# Patient Record
Sex: MALE | Race: BLACK OR AFRICAN AMERICAN
[De-identification: names, ages, dates, MRNs, and addresses within clinical notes are randomized per-mention and may not be internally consistent; named-entity substitution may affect disease eponyms.]

---

## 2019-08-17 ENCOUNTER — HOSPITAL ENCOUNTER (EMERGENCY)
Dept: HOSPITAL 53 - M ED | Age: 29
Discharge: HOME | End: 2019-08-17
Payer: COMMERCIAL

## 2019-08-17 VITALS — DIASTOLIC BLOOD PRESSURE: 72 MMHG | SYSTOLIC BLOOD PRESSURE: 118 MMHG

## 2019-08-17 VITALS — BODY MASS INDEX: 19.81 KG/M2 | WEIGHT: 149.47 LBS | HEIGHT: 73 IN

## 2019-08-17 DIAGNOSIS — J02.9: Primary | ICD-10-CM

## 2019-08-17 PROCEDURE — 99284 EMERGENCY DEPT VISIT MOD MDM: CPT

## 2019-08-17 PROCEDURE — 96372 THER/PROPH/DIAG INJ SC/IM: CPT

## 2019-08-17 PROCEDURE — 87880 STREP A ASSAY W/OPTIC: CPT

## 2019-08-19 ENCOUNTER — HOSPITAL ENCOUNTER (EMERGENCY)
Dept: HOSPITAL 53 - M ED | Age: 29
Discharge: HOME | End: 2019-08-19
Payer: COMMERCIAL

## 2019-08-19 VITALS — BODY MASS INDEX: 19.46 KG/M2 | HEIGHT: 73 IN | WEIGHT: 146.83 LBS

## 2019-08-19 VITALS — DIASTOLIC BLOOD PRESSURE: 67 MMHG | SYSTOLIC BLOOD PRESSURE: 117 MMHG

## 2019-08-19 DIAGNOSIS — Z79.01: ICD-10-CM

## 2019-08-19 DIAGNOSIS — J03.90: Primary | ICD-10-CM

## 2019-08-19 LAB
BASOPHILS # BLD AUTO: 0 10^3/UL (ref 0–0.2)
BASOPHILS NFR BLD AUTO: 0.9 % (ref 0–1)
BUN SERPL-MCNC: 12 MG/DL (ref 7–18)
CALCIUM SERPL-MCNC: 9.6 MG/DL (ref 8.5–10.1)
CHLORIDE SERPL-SCNC: 104 MEQ/L (ref 98–107)
CO2 SERPL-SCNC: 30 MEQ/L (ref 21–32)
CREAT SERPL-MCNC: 0.95 MG/DL (ref 0.7–1.3)
EOSINOPHIL # BLD AUTO: 0.1 10^3/UL (ref 0–0.5)
EOSINOPHIL NFR BLD AUTO: 2.7 % (ref 0–3)
GFR SERPL CREATININE-BSD FRML MDRD: > 60 ML/MIN/{1.73_M2} (ref 60–?)
GLUCOSE SERPL-MCNC: 84 MG/DL (ref 70–100)
HCT VFR BLD AUTO: 42.1 % (ref 42–52)
HETEROPH AB SER QL LA: NEGATIVE
HGB BLD-MCNC: 14.3 G/DL (ref 13.5–17.5)
LYMPHOCYTES # BLD AUTO: 1.2 10^3/UL (ref 1.5–6.5)
LYMPHOCYTES NFR BLD AUTO: 26.7 % (ref 24–44)
MCH RBC QN AUTO: 30 PG (ref 27–33)
MCHC RBC AUTO-ENTMCNC: 34 G/DL (ref 32–36.5)
MCV RBC AUTO: 88.3 FL (ref 80–96)
MONOCYTES # BLD AUTO: 0.7 10^3/UL (ref 0–0.8)
MONOCYTES NFR BLD AUTO: 15.2 % (ref 0–5)
NEUTROPHILS # BLD AUTO: 2.4 10^3/UL (ref 1.8–7.7)
NEUTROPHILS NFR BLD AUTO: 54.3 % (ref 36–66)
PLATELET # BLD AUTO: 301 10^3/UL (ref 150–450)
POTASSIUM SERPL-SCNC: 4.4 MEQ/L (ref 3.5–5.1)
RBC # BLD AUTO: 4.77 10^6/UL (ref 4.3–6.1)
SODIUM SERPL-SCNC: 140 MEQ/L (ref 136–145)
WBC # BLD AUTO: 4.4 10^3/UL (ref 4–10)

## 2020-10-18 ENCOUNTER — HOSPITAL ENCOUNTER (EMERGENCY)
Dept: HOSPITAL 53 - M ED | Age: 30
LOS: 1 days | Discharge: HOME | End: 2020-10-19
Payer: COMMERCIAL

## 2020-10-18 VITALS — WEIGHT: 165.13 LBS | HEIGHT: 73 IN | BODY MASS INDEX: 21.88 KG/M2

## 2020-10-18 DIAGNOSIS — J06.9: Primary | ICD-10-CM

## 2020-10-18 DIAGNOSIS — R00.0: ICD-10-CM

## 2020-10-18 LAB
BASOPHILS # BLD AUTO: 0.1 10^3/UL (ref 0–0.2)
BASOPHILS NFR BLD AUTO: 0.6 % (ref 0–1)
EOSINOPHIL # BLD AUTO: 0 10^3/UL (ref 0–0.5)
EOSINOPHIL NFR BLD AUTO: 0.2 % (ref 0–3)
HCT VFR BLD AUTO: 42 % (ref 42–52)
HGB BLD-MCNC: 14.2 G/DL (ref 13.5–17.5)
LYMPHOCYTES # BLD AUTO: 0.9 10^3/UL (ref 1.5–5)
LYMPHOCYTES NFR BLD AUTO: 10.7 % (ref 24–44)
MCH RBC QN AUTO: 29.3 PG (ref 27–33)
MCHC RBC AUTO-ENTMCNC: 33.8 G/DL (ref 32–36.5)
MCV RBC AUTO: 86.8 FL (ref 80–96)
MONOCYTES # BLD AUTO: 1.6 10^3/UL (ref 0–0.8)
MONOCYTES NFR BLD AUTO: 19.5 % (ref 0–5)
NEUTROPHILS # BLD AUTO: 5.5 10^3/UL (ref 1.5–8.5)
NEUTROPHILS NFR BLD AUTO: 68.8 % (ref 36–66)
PLATELET # BLD AUTO: 225 10^3/UL (ref 150–450)
RBC # BLD AUTO: 4.84 10^6/UL (ref 4.3–6.1)
WBC # BLD AUTO: 8 10^3/UL (ref 4–10)

## 2020-10-19 VITALS — SYSTOLIC BLOOD PRESSURE: 137 MMHG | DIASTOLIC BLOOD PRESSURE: 82 MMHG

## 2020-10-19 LAB
BUN SERPL-MCNC: 8 MG/DL (ref 7–18)
CALCIUM SERPL-MCNC: 8.9 MG/DL (ref 8.5–10.1)
CHLORIDE SERPL-SCNC: 103 MEQ/L (ref 98–107)
CO2 SERPL-SCNC: 30 MEQ/L (ref 21–32)
CREAT SERPL-MCNC: 1.18 MG/DL (ref 0.7–1.3)
GFR SERPL CREATININE-BSD FRML MDRD: > 60 ML/MIN/{1.73_M2} (ref 60–?)
GLUCOSE SERPL-MCNC: 81 MG/DL (ref 70–100)
POTASSIUM SERPL-SCNC: 3.8 MEQ/L (ref 3.5–5.1)
SODIUM SERPL-SCNC: 139 MEQ/L (ref 136–145)

## 2020-10-19 NOTE — REPVR
PROCEDURE INFORMATION: 

Exam: XR Chest, 1 View 

Exam date and time: 10/19/2020 12:41 AM 

Age: 30 years old 

Clinical indication: Other: Sepsis/ shock; Additional info: Sepsis/shock 



TECHNIQUE: 

Imaging protocol: XR of the chest 

Views: 1 view. 



COMPARISON: 

No relevant prior studies available. 



FINDINGS: 

Lungs: Degree of lung inflation is normal. No evidence of pulmonary edema. No 

focal consolidation or parenchymal lung mass. 

Pleural space: No pleural effusion or pneumothorax. 

Heart/Mediastinum: Cardiac silhouette appears normal. No adenopathy or hilar 

mass. 

Bones/joints: Osseous structures show no concerning abnormality. 



IMPRESSION: 

No acute or focal cardiopulmonary process. 



Electronically signed by: Marshall Tamayo On 10/19/2020  00:45:46 AM

## 2020-10-20 NOTE — ECGEPIP
[facility] - ED

                                       

                                       Test Date:    2020-10-19

Pat Name:     ERIC FRANCO              Department:   

Patient ID:   [id number]                 Room:         -

Gender:       Male                     Technician:   

:          1990               Requested By: ROSSY MAHMOOD

Order Number: [id number]     Reading MD:   Austin Shaffer

                                 Measurements

Intervals                              Axis          

Rate:         102                      P:            71

VA:           165                      QRS:          88

QRSD:         98                       T:            2

QT:           313                                    

QTc:          409                                    

                           Interpretive Statements

SINUS TACHYCARDIA

NONSPECIFIC T-WAVE ABNORMALITY

NO PRIORS FOR COMPARISON

Electronically Signed on 10- 8:11:34 EDT by Austin Shaffer

## 2020-11-30 ENCOUNTER — HOSPITAL ENCOUNTER (EMERGENCY)
Dept: HOSPITAL 53 - M ED | Age: 30
Discharge: HOME | End: 2020-11-30
Payer: COMMERCIAL

## 2020-11-30 VITALS — WEIGHT: 162.92 LBS | HEIGHT: 73 IN | BODY MASS INDEX: 21.59 KG/M2

## 2020-11-30 VITALS — DIASTOLIC BLOOD PRESSURE: 77 MMHG | SYSTOLIC BLOOD PRESSURE: 134 MMHG

## 2020-11-30 DIAGNOSIS — R10.13: Primary | ICD-10-CM

## 2020-11-30 DIAGNOSIS — Z79.899: ICD-10-CM

## 2020-11-30 LAB
ALBUMIN SERPL BCG-MCNC: 4 GM/DL (ref 3.2–5.2)
ALT SERPL W P-5'-P-CCNC: 16 U/L (ref 12–78)
BASOPHILS # BLD AUTO: 0.1 10^3/UL (ref 0–0.2)
BASOPHILS NFR BLD AUTO: 0.8 % (ref 0–1)
BILIRUB CONJ SERPL-MCNC: 0.2 MG/DL (ref 0–0.2)
BILIRUB SERPL-MCNC: 0.4 MG/DL (ref 0.2–1)
BUN SERPL-MCNC: 8 MG/DL (ref 7–18)
CALCIUM SERPL-MCNC: 9.2 MG/DL (ref 8.5–10.1)
CHLORIDE SERPL-SCNC: 106 MEQ/L (ref 98–107)
CK MB CFR.DF SERPL CALC: 0.98
CK MB SERPL-MCNC: < 1 NG/ML (ref ?–3.6)
CK SERPL-CCNC: 102 U/L (ref 39–308)
CO2 SERPL-SCNC: 30 MEQ/L (ref 21–32)
CREAT SERPL-MCNC: 0.91 MG/DL (ref 0.7–1.3)
EOSINOPHIL # BLD AUTO: 0.5 10^3/UL (ref 0–0.5)
EOSINOPHIL NFR BLD AUTO: 8.3 % (ref 0–3)
GFR SERPL CREATININE-BSD FRML MDRD: > 60 ML/MIN/{1.73_M2} (ref 60–?)
GLUCOSE SERPL-MCNC: 84 MG/DL (ref 70–100)
HCT VFR BLD AUTO: 40.7 % (ref 42–52)
HGB BLD-MCNC: 13.5 G/DL (ref 13.5–17.5)
LIPASE SERPL-CCNC: 110 U/L (ref 73–393)
LYMPHOCYTES # BLD AUTO: 1.4 10^3/UL (ref 1.5–5)
LYMPHOCYTES NFR BLD AUTO: 21.8 % (ref 24–44)
MCH RBC QN AUTO: 29 PG (ref 27–33)
MCHC RBC AUTO-ENTMCNC: 33.2 G/DL (ref 32–36.5)
MCV RBC AUTO: 87.3 FL (ref 80–96)
MONOCYTES # BLD AUTO: 0.9 10^3/UL (ref 0–0.8)
MONOCYTES NFR BLD AUTO: 14 % (ref 0–5)
NEUTROPHILS # BLD AUTO: 3.4 10^3/UL (ref 1.5–8.5)
NEUTROPHILS NFR BLD AUTO: 54.9 % (ref 36–66)
PLATELET # BLD AUTO: 229 10^3/UL (ref 150–450)
POTASSIUM SERPL-SCNC: 3.9 MEQ/L (ref 3.5–5.1)
PROT SERPL-MCNC: 7.4 GM/DL (ref 6.4–8.2)
RBC # BLD AUTO: 4.66 10^6/UL (ref 4.3–6.1)
SODIUM SERPL-SCNC: 137 MEQ/L (ref 136–145)
TROPONIN I SERPL-MCNC: < 0.02 NG/ML (ref ?–0.1)
WBC # BLD AUTO: 6.2 10^3/UL (ref 4–10)

## 2020-11-30 NOTE — ECGEPIP
[facility] - ED

                                       

                                       Test Date:    2020

Pat Name:     ERIC FRANCO              Department:   

Patient ID:   [id number]                 Room:         -

Gender:       Male                     Technician:   dilcia

:          1990               Requested By: LEIGH CLINE

Order Number: [id number]     Reading MD:   Austin Shaffer

                                 Measurements

Intervals                              Axis          

Rate:         72                       P:            73

TX:           156                      QRS:          91

QRSD:         101                      T:            37

QT:           356                                    

QTc:          392                                    

                           Interpretive Statements

SINUS RHYTHM

BORDERLINE RIGHT AXIS DEVIATION

MODERATE INTRAVENTRICULAR CONDUCTION DELAY

BENIGN EARLY REPOLARIZATION

NO PRIORS FOR COMPARISON

Electronically Signed on 2020 19:12:46 EST by Austin Shaffer

## 2020-11-30 NOTE — REP
INDICATION:

midsternal chest pain



COMPARISON:

10/19/2020



TECHNIQUE:

PA and lateral.



FINDINGS:

The mediastinum and cardiac silhouette are normal.  The lung fields are clear and

without acute consolidation, effusion, or pneumothorax.  The skeletal structures are

intact and normal.



IMPRESSION:

No acute cardiopulmonary process.





<Electronically signed by Raffaele Saez > 11/30/20 [id number]